# Patient Record
Sex: FEMALE | Race: WHITE | NOT HISPANIC OR LATINO | Employment: STUDENT | URBAN - METROPOLITAN AREA
[De-identification: names, ages, dates, MRNs, and addresses within clinical notes are randomized per-mention and may not be internally consistent; named-entity substitution may affect disease eponyms.]

---

## 2024-03-06 ENCOUNTER — OFFICE VISIT (OUTPATIENT)
Age: 19
End: 2024-03-06
Payer: COMMERCIAL

## 2024-03-06 VITALS
HEIGHT: 64 IN | DIASTOLIC BLOOD PRESSURE: 68 MMHG | RESPIRATION RATE: 16 BRPM | SYSTOLIC BLOOD PRESSURE: 118 MMHG | WEIGHT: 163.14 LBS | BODY MASS INDEX: 27.85 KG/M2 | HEART RATE: 79 BPM | OXYGEN SATURATION: 98 % | TEMPERATURE: 98.9 F

## 2024-03-06 DIAGNOSIS — R68.89 FLU-LIKE SYMPTOMS: Primary | ICD-10-CM

## 2024-03-06 DIAGNOSIS — H65.03 NON-RECURRENT ACUTE SEROUS OTITIS MEDIA OF BOTH EARS: ICD-10-CM

## 2024-03-06 PROCEDURE — 99203 OFFICE O/P NEW LOW 30 MIN: CPT | Performed by: EMERGENCY MEDICINE

## 2024-03-06 RX ORDER — PREDNISONE 10 MG/1
TABLET ORAL
Qty: 27 TABLET | Refills: 0 | Status: SHIPPED | OUTPATIENT
Start: 2024-03-06

## 2024-03-06 RX ORDER — NORETHINDRONE ACETATE AND ETHINYL ESTRADIOL 1MG-20(21)
KIT ORAL
COMMUNITY

## 2024-03-06 NOTE — PROGRESS NOTES
[facility]        NAME: Farideh Carr is a 18 y.o. female  : 2005    MRN: [id number]  DATE: 2024  TIME: 4:50 PM    Assessment and Plan   Flu-like symptoms [R68.89]  1. Flu-like symptoms  predniSONE 10 mg tablet            Patient Instructions     Patient Instructions   You have been diagnosed with a Flu like illness and your symptoms should resolve over the next 7 to 10 days with the treatments recommended today.  If they do not, it is possible that you have developed a bacterial infection and you should return. If you were to take an antibiotic while you are still in the viral stage, you will not get better any faster, but could kill off good germs in your body as well as make germs resistant to the antibiotic.  Take an expectorant - guaifenesin should be the only ingredient - during the day, and the cough suppressant (ex. Robitussin DM or Tessalon) if needed at night only.   Take Zinc 25 mg every 12 hours for the next week (the dose is important so do not just take a multivitamin with zinc or an over-the-counter cold med with zinc such as Airborne or Zicam, as that will not give you the sufficient dose).  You should also take   You should also take vitamin D3 5000 i.u.s per day for the next 1 week, and vitamin-C 1000 mg twice daily (and again dosages are important, do not think you are getting enough vitamin C just by drinking extra orange juice).  May take Flonase as discussed.  You may also take a decongestant like Sudafed, unless you have hypertension or cardiac disease.  You may take Imodium for diarrhea according to package instructions.     If you are diabetic you should adhere strictly to your diabetic diet and monitor blood sugar closely while on prednisone and you should discontinue the prednisone if blood sugar becomes significantly elevated.  Avoid nonsteroidal anti-inflammatories like Advil or Aleve while on prednisone.       Flu-like illness   AMBULATORY CARE:    Flu-like illness is an infection caused by a virus. The flu is easily spread when an infected person coughs, sneezes, or has close contact with others. You may be able to spread the flu to others for 1 week or longer after signs or symptoms appear.   Common signs and symptoms include the following:   Fever and chills    Headaches, body aches, and muscle or joint pain    Cough, runny nose, and sore throat    Loss of appetite, nausea, vomiting, or diarrhea    Tiredness    Trouble breathing  Call 911 for any of the following:   You have trouble breathing, and your lips look purple or blue.    You have a seizure.  Seek care immediately if:   You are dizzy, or you are urinating less or not at all.     You have a headache with a stiff neck, and you feel tired or confused.    You have new pain or pressure in your chest.    Your symptoms, such as shortness of breath, vomiting, or diarrhea, get worse.     Your symptoms, such as fever and coughing, seem to get better, but then get worse.  Contact your healthcare provider if:   You have new muscle pain or weakness.    You have questions or concerns about your condition or care.  Treatment for influenza  may include any of the following:  Acetaminophen decreases pain and fever. It is available without a doctor's order. Ask how much to take and how often to take it. Follow directions. Acetaminophen can cause liver damage if not taken correctly.    NSAIDs  such as ibuprofen, help decrease swelling, pain, and fever. This medicine is available with or without a doctor's order. NSAIDs can cause stomach bleeding or kidney problems in certain people. If you take blood thinner medicine, always ask your healthcare provider if NSAIDs are safe for you. Always read the medicine label and follow directions.    Antivirals  help fight a viral infection.  Manage your symptoms:   Rest  as much as you can to help you recover.    Drink liquids as directed  to help prevent dehydration. Ask how  much liquid to drink each day and which liquids are best for you.  Prevent the spread of the flu:   Wash your hands often.  Use soap and water. Wash your hands after you use the bathroom, change a child's diapers, or sneeze. Wash your hands before you prepare or eat food. Use gel hand cleanser when soap and water are not available. Do not touch your eyes, nose, or mouth unless you have washed your hands first.       Cover your mouth when you sneeze or cough.  Cough into a tissue or the bend of your arm.    Clean shared items with a germ-killing .  Clean table surfaces, doorknobs, and light switches. Do not share towels, silverware, and dishes with people who are sick. Wash bed sheets, towels, silverware, and dishes with soap and water.     Wear a mask  over your mouth and nose if you are sick or are near anyone who is sick.     Stay away from others  if you are sick.     Follow up with your healthcare provider as directed:  Write down your questions so you remember to ask them during your visits.   © 2017 [employer] Information is for End User's use only and may not be sold, redistributed or otherwise used for commercial purposes. All illustrations and images included in CareNotes® are the copyrighted property of [employer] or [employer].  The above information is an  only. It is not intended as medical advice for individual conditions or treatments. Talk to your doctor, nurse or pharmacist before following any medical regimen to see if it is safe and effective for you.       Fluid In The Ear (Serous Otitis Media)   WHAT YOU NEED TO KNOW:   REBECA is fluid trapped in the middle of your ear behind your eardrum. This condition usually develops without signs or symptoms of an ear infection. Serous otitis media may be caused by an upper respiratory infection or allergies. It is most common in the fall and early spring.       DISCHARGE INSTRUCTIONS:   Call your doctor  if:   You develop severe ear pain.    The outside of your ear is red or swollen.    You have fluid coming from your ear.    You have questions or concerns about your condition or care.    How to stay healthy:   Wash your hands often throughout the day.  Use soap and water. Rub your soapy hands together, lacing your fingers, for at least 20 seconds. Rinse with warm, running water. Dry your hands with a clean towel or paper towel. Use hand  that contains alcohol if soap and water are not available. Teach children how to wash their hands and use hand .         Avoid people who are sick.  Some germs are easily and quickly spread through contact.    Follow up with your doctor as directed:  Write down your questions so you remember to ask them during your visits.   © Copyright [employer] 2023 Information is for End User's use only and may not be sold, redistributed or otherwise used for commercial purposes.  The above information is an  only. It is not intended as medical advice for individual conditions or treatments. Talk to your doctor, nurse or pharmacist before following any medical regimen to see if it is safe and effective for you.        Follow up with PCP in 3-5 days.  Proceed to  ER if symptoms worsen.    Chief Complaint     Chief Complaint   Patient presents with    Cold Like Symptoms     Stuffy nose, earache, sore throat, headache and painful respirations, fever/chills. Started Monday 03/04/2024. Tylenol cold without relief.          History of Present Illness       Patient complains of sore throat, congestion, ear fullness, headaches, fevers, chills for the past 2 days.                Review of Systems   Review of Systems   Constitutional:  Positive for chills and fever. Negative for appetite change and fatigue.   HENT:  Positive for congestion, ear pain, rhinorrhea, sinus pressure and sore throat. Negative for trouble swallowing and voice change.    Respiratory:  Positive for  "cough. Negative for chest tightness, shortness of breath and wheezing.    Cardiovascular:  Negative for chest pain.   Gastrointestinal:  Negative for nausea.   Musculoskeletal:  Negative for myalgias.   Neurological:  Positive for headaches.         Current Medications       Current Outpatient Medications:     predniSONE 10 mg tablet, Take once daily all days pills on this schedule 6- 6- 5- 4- 3- 2- 1, Disp: 27 tablet, Rfl: 0    norethindrone-ethinyl estradiol (JUNEL FE 1/20) 1-20 MG-MCG per tablet, Take by mouth, Disp: , Rfl:     Current Allergies     Allergies as of 03/06/2024    (No Known Allergies)            The following portions of the patient's history were reviewed and updated as appropriate: allergies, current medications, past family history, past medical history, past social history, past surgical history and problem list.     History reviewed. No pertinent past medical history.    Past Surgical History:   Procedure Laterality Date    ADENOIDECTOMY      APPENDECTOMY      EYE SURGERY      TONSILLECTOMY         Family History   Problem Relation Age of Onset    Hypertension Mother     Long QT syndrome Mother     Hypertension Father     VIRGINIE disease Father          Medications have been verified.        Objective   /68   Pulse 79   Temp 98.9 °F (37.2 °C) (Tympanic)   Resp 16   Ht 5' 4\" (1.626 m)   Wt 74 kg (163 lb 2.3 oz)   LMP 02/26/2024 (Exact Date)   SpO2 98%   BMI 28.00 kg/m²        Physical Exam     Physical Exam  Vitals and nursing note reviewed.   Constitutional:       General: She is not in acute distress.     Appearance: She is well-developed. She is not ill-appearing or toxic-appearing.   HENT:      Head: Normocephalic and atraumatic.      Right Ear: External ear normal.      Left Ear: External ear normal.      Ears:      Comments: Clear effusions behind both TMs, no erythema of TMs.     Nose: Mucosal edema and congestion present.      Mouth/Throat:      Pharynx: Posterior " oropharyngeal erythema present. No oropharyngeal exudate.      Tonsils: No tonsillar abscesses.   Cardiovascular:      Rate and Rhythm: Normal rate and regular rhythm.   Pulmonary:      Effort: Pulmonary effort is normal. No respiratory distress.      Breath sounds: No wheezing or rales.   Musculoskeletal:      Cervical back: Neck supple.   Skin:     General: Skin is warm and dry.   Neurological:      Mental Status: She is alert and oriented to person, place, and time.   Psychiatric:         Mood and Affect: Mood normal.         Behavior: Behavior normal.         Thought Content: Thought content normal.         Judgment: Judgment normal.

## 2024-03-06 NOTE — PATIENT INSTRUCTIONS
You have been diagnosed with a Flu like illness and your symptoms should resolve over the next 7 to 10 days with the treatments recommended today.  If they do not, it is possible that you have developed a bacterial infection and you should return. If you were to take an antibiotic while you are still in the viral stage, you will not get better any faster, but could kill off good germs in your body as well as make germs resistant to the antibiotic.  Take an expectorant - guaifenesin should be the only ingredient - during the day, and the cough suppressant (ex. Robitussin DM or Tessalon) if needed at night only.   Take Zinc 25 mg every 12 hours for the next week (the dose is important so do not just take a multivitamin with zinc or an over-the-counter cold med with zinc such as Airborne or Zicam, as that will not give you the sufficient dose).  You should also take   You should also take vitamin D3 5000 i.u.s per day for the next 1 week, and vitamin-C 1000 mg twice daily (and again dosages are important, do not think you are getting enough vitamin C just by drinking extra orange juice).  May take Flonase as discussed.  You may also take a decongestant like Sudafed, unless you have hypertension or cardiac disease.  You may take Imodium for diarrhea according to package instructions.     If you are diabetic you should adhere strictly to your diabetic diet and monitor blood sugar closely while on prednisone and you should discontinue the prednisone if blood sugar becomes significantly elevated.  Avoid nonsteroidal anti-inflammatories like Advil or Aleve while on prednisone.       Flu-like illness   AMBULATORY CARE:   Flu-like illness is an infection caused by a virus. The flu is easily spread when an infected person coughs, sneezes, or has close contact with others. You may be able to spread the flu to others for 1 week or longer after signs or symptoms appear.   Common signs and symptoms include the following:   Fever  and chills    Headaches, body aches, and muscle or joint pain    Cough, runny nose, and sore throat    Loss of appetite, nausea, vomiting, or diarrhea    Tiredness    Trouble breathing  Call 911 for any of the following:   You have trouble breathing, and your lips look purple or blue.    You have a seizure.  Seek care immediately if:   You are dizzy, or you are urinating less or not at all.     You have a headache with a stiff neck, and you feel tired or confused.    You have new pain or pressure in your chest.    Your symptoms, such as shortness of breath, vomiting, or diarrhea, get worse.     Your symptoms, such as fever and coughing, seem to get better, but then get worse.  Contact your healthcare provider if:   You have new muscle pain or weakness.    You have questions or concerns about your condition or care.  Treatment for influenza  may include any of the following:  Acetaminophen decreases pain and fever. It is available without a doctor's order. Ask how much to take and how often to take it. Follow directions. Acetaminophen can cause liver damage if not taken correctly.    NSAIDs  such as ibuprofen, help decrease swelling, pain, and fever. This medicine is available with or without a doctor's order. NSAIDs can cause stomach bleeding or kidney problems in certain people. If you take blood thinner medicine, always ask your healthcare provider if NSAIDs are safe for you. Always read the medicine label and follow directions.    Antivirals  help fight a viral infection.  Manage your symptoms:   Rest  as much as you can to help you recover.    Drink liquids as directed  to help prevent dehydration. Ask how much liquid to drink each day and which liquids are best for you.  Prevent the spread of the flu:   Wash your hands often.  Use soap and water. Wash your hands after you use the bathroom, change a child's diapers, or sneeze. Wash your hands before you prepare or eat food. Use gel hand cleanser when soap and  water are not available. Do not touch your eyes, nose, or mouth unless you have washed your hands first.       Cover your mouth when you sneeze or cough.  Cough into a tissue or the bend of your arm.    Clean shared items with a germ-killing .  Clean table surfaces, doorknobs, and light switches. Do not share towels, silverware, and dishes with people who are sick. Wash bed sheets, towels, silverware, and dishes with soap and water.     Wear a mask  over your mouth and nose if you are sick or are near anyone who is sick.     Stay away from others  if you are sick.     Follow up with your healthcare provider as directed:  Write down your questions so you remember to ask them during your visits.   © 2017 [employer] Information is for End User's use only and may not be sold, redistributed or otherwise used for commercial purposes. All illustrations and images included in CareNotes® are the copyrighted property of [employer]. or [employer].  The above information is an  only. It is not intended as medical advice for individual conditions or treatments. Talk to your doctor, nurse or pharmacist before following any medical regimen to see if it is safe and effective for you.       Fluid In The Ear (Serous Otitis Media)   WHAT YOU NEED TO KNOW:   REBECA is fluid trapped in the middle of your ear behind your eardrum. This condition usually develops without signs or symptoms of an ear infection. Serous otitis media may be caused by an upper respiratory infection or allergies. It is most common in the fall and early spring.       DISCHARGE INSTRUCTIONS:   Call your doctor if:   You develop severe ear pain.    The outside of your ear is red or swollen.    You have fluid coming from your ear.    You have questions or concerns about your condition or care.    How to stay healthy:   Wash your hands often throughout the day.  Use soap and water. Rub your soapy hands together,  lacing your fingers, for at least 20 seconds. Rinse with warm, running water. Dry your hands with a clean towel or paper towel. Use hand  that contains alcohol if soap and water are not available. Teach children how to wash their hands and use hand .         Avoid people who are sick.  Some germs are easily and quickly spread through contact.    Follow up with your doctor as directed:  Write down your questions so you remember to ask them during your visits.   © Copyright [employer] 2023 Information is for End User's use only and may not be sold, redistributed or otherwise used for commercial purposes.  The above information is an  only. It is not intended as medical advice for individual conditions or treatments. Talk to your doctor, nurse or pharmacist before following any medical regimen to see if it is safe and effective for you.

## 2025-02-24 ENCOUNTER — OFFICE VISIT (OUTPATIENT)
Age: 20
End: 2025-02-24
Payer: COMMERCIAL

## 2025-02-24 VITALS
RESPIRATION RATE: 18 BRPM | WEIGHT: 159.61 LBS | OXYGEN SATURATION: 99 % | BODY MASS INDEX: 27.25 KG/M2 | TEMPERATURE: 97.8 F | SYSTOLIC BLOOD PRESSURE: 122 MMHG | HEART RATE: 64 BPM | DIASTOLIC BLOOD PRESSURE: 60 MMHG | HEIGHT: 64 IN

## 2025-02-24 DIAGNOSIS — J06.9 ACUTE URI: Primary | ICD-10-CM

## 2025-02-24 PROCEDURE — S9083 URGENT CARE CENTER GLOBAL: HCPCS

## 2025-02-24 PROCEDURE — G0382 LEV 3 HOSP TYPE B ED VISIT: HCPCS

## 2025-02-24 RX ORDER — BENZONATATE 200 MG/1
200 CAPSULE ORAL 3 TIMES DAILY PRN
Qty: 20 CAPSULE | Refills: 0 | Status: SHIPPED | OUTPATIENT
Start: 2025-02-24

## 2025-02-24 RX ORDER — METHYLPREDNISOLONE 4 MG/1
TABLET ORAL
Qty: 21 TABLET | Refills: 0 | Status: SHIPPED | OUTPATIENT
Start: 2025-02-24

## 2025-02-24 NOTE — PATIENT INSTRUCTIONS
"Patient Education     Upper respiratory infection in adults - Discharge instructions   The Basics   Written by the doctors and editors at [facility]   What are discharge instructions? -- Discharge instructions are information about how to take care of yourself after getting medical care for a health problem.  What is an upper respiratory infection? -- An upper respiratory infection (\"URI\") is an illness that can affect your nose, throat, ears, and sinuses. Almost all URIs are caused by a virus. The common cold is an example of a viral URI. Some URIs are caused by bacteria, but this is much less common.  URIs spread easily from person to person, most often through coughing or sneezing. A URI will almost always get better in a week or 2 without any treatment. Because most URIs are caused by viruses, antibiotics do not usually help.  If you do have a bacterial infection, your doctor might prescribe antibiotics.  How do I care for myself at home? -- Ask the doctor or nurse what you should do when you go home. Make sure that you understand exactly what you need to do to care for yourself. Ask questions if there is anything you do not understand.  You should also:   Wash your hands often (figure 1), and cough or sneeze into a tissue. If you do not have a tissue, cough or sneeze into your elbow instead of your hands.   Drink lots of fluids (water, juice, or broth) to stay hydrated, unless your doctor told you otherwise. This will help replace any fluids lost through runny nose or fever. Warm tea or soup can also help soothe a sore throat.   To help a stuffy nose and make it easier to breathe:   Use saline nose drops or spray.   Use a humidifier if the air in your home feels dry.   Follow the directions on the label carefully if you take over-the-counter cough or cold medicines. Do not take more than 1 medicine that contains acetaminophen. Also, if you have a heart problem or high blood pressure, check with your doctor before " you take any of these medicines.   Try to quit smoking if you smoke. Your doctor or nurse can help.  How can I prevent getting another URI? -- The best way to prevent a URI, or keep it from spreading to others, is to keep your hands clean. Wash your hands often with soap and water or alcohol gel rubs.  Some other ways to prevent the spread of infection include:   Always wash your hands with soap and water after you cough, sneeze, or blow your nose.   Clean surfaces and objects that you touch a lot. These include sinks, counters, tables, door handles, remotes, and phones. Use a bleach and water mixture. The germs that cause a URI can live on surfaces for at least 2 hours.   Do not share cups, food, towels, bed linens, or other personal items.   Stay away from other people when you are sick. When you do need to be around other people, consider wearing a face mask.  When should I call the doctor? -- Call for advice if:   You have a persistent fever of 100.4°F (38°C) or higher, chills, a very bad sore throat, or ear or sinus pain.   You get a new fever after several days of feeling the same or getting better.   You start having chest pain when you cough.   You have a cough that lasts more than 10 days.   You cough up blood.   You have any new or worsening symptoms, such as worsening cough or trouble breathing.  All topics are updated as new evidence becomes available and our peer review process is complete.  This topic retrieved from [employer] on: Mar 13, 2024.  Topic 943008 Version 1.0  Release: 32.2.4 - C32.71  © 2024 UpToDate, Inc. and/or its affiliates. All rights reserved.  figure 1: How to wash your hands     Wet your hands with clean water, and apply a small amount of soap. Lather and rub hands together for at least 20 seconds. Clean your wrists, palms, backs of your hands, between your fingers, tips of your fingers, thumbs, and under and around your nails. Rinse well, and dry your hands using a clean  ayaz.  Graphic 470893 Version 7.0  Consumer Information Use and Disclaimer   Disclaimer: This generalized information is a limited summary of diagnosis, treatment, and/or medication information. It is not meant to be comprehensive and should be used as a tool to help the user understand and/or assess potential diagnostic and treatment options. It does NOT include all information about conditions, treatments, medications, side effects, or risks that may apply to a specific patient. It is not intended to be medical advice or a substitute for the medical advice, diagnosis, or treatment of a health care provider based on the health care provider's examination and assessment of a patient's specific and unique circumstances. Patients must speak with a health care provider for complete information about their health, medical questions, and treatment options, including any risks or benefits regarding use of medications. This information does not endorse any treatments or medications as safe, effective, or approved for treating a specific patient. UpToDate, Inc. and its affiliates disclaim any warranty or liability relating to this information or the use thereof.The use of this information is governed by the Terms of Use, available at https://www.[employer].com/en/know/clinical-effectiveness-terms. 2024© UpToDate, Inc. and its affiliates and/or licensors. All rights reserved.  Copyright   © 2024 UpToDate, Inc. and/or its affiliates. All rights reserved.

## 2025-03-31 ENCOUNTER — OFFICE VISIT (OUTPATIENT)
Age: 20
End: 2025-03-31
Payer: COMMERCIAL

## 2025-03-31 VITALS
OXYGEN SATURATION: 99 % | HEIGHT: 64 IN | BODY MASS INDEX: 27.1 KG/M2 | HEART RATE: 78 BPM | RESPIRATION RATE: 18 BRPM | WEIGHT: 158.73 LBS | DIASTOLIC BLOOD PRESSURE: 76 MMHG | TEMPERATURE: 98.8 F | SYSTOLIC BLOOD PRESSURE: 114 MMHG

## 2025-03-31 DIAGNOSIS — G44.319 ACUTE POST-TRAUMATIC HEADACHE, NOT INTRACTABLE: Primary | ICD-10-CM

## 2025-03-31 DIAGNOSIS — L60.8 NAIL DISCOLORATION: ICD-10-CM

## 2025-03-31 PROCEDURE — S9083 URGENT CARE CENTER GLOBAL: HCPCS

## 2025-03-31 PROCEDURE — G0383 LEV 4 HOSP TYPE B ED VISIT: HCPCS

## 2025-03-31 NOTE — LETTER
March 31, 2025     Patient: Farideh Carr   YOB: 2005   Date of Visit: 3/31/2025       To Whom it May Concern:    Farideh Carr was seen in my clinic on 3/31/2025. She may return to school on 4/3/2025 or earlier if symptoms are improving .    If you have any questions or concerns, please don't hesitate to call.         Sincerely,          Kerrie Spear PA-C        CC: No Recipients

## 2025-03-31 NOTE — PATIENT INSTRUCTIONS
"Head Injury:  Over-the-counter pain medication as directed on packaging as needed for pain-may alternate Tylenol and ibuprofen every 3 hours.  Ice to area.  Decrease screen time.  Rest.  Ensure adequate hydration and nutrition.    Toenail problem:  Continue with over-the-counter fungal medication to area.  Referral to podiatry placed for follow-up if no improvement in symptoms.    Follow up with PCP in 3-5 days.  Proceed to  ER if symptoms worsen.    If tests are performed, our office will contact you with results only if changes need to made to the care plan discussed with you at the visit. You can review your full results on [facility].    Patient Education     Head injury in adults   The Basics   Written by the doctors and editors at [facility]   What causes head injuries? -- A head injury can happen when a person hits their head on a hard surface or is hit in the head with something. The most common causes of head injury are falls, sports injuries, and car and bike accidents.  Some head injuries are minor, like a bump on the head. With minor head injuries, the skull protects the brain from damage. Others can be more serious and cause brain injury. A \"concussion\" is the medical term for a mild brain injury. Sometimes, head injuries can be serious or life-threatening.  A more serious head injury can cause:   Broken bone of the skull or face (figure 1)   Brain injury or swelling   Bleeding in or around the brain  This article discusses head injuries in people 18 years and older. Head injuries in children might be managed differently.  What are the symptoms of a head injury? -- Symptoms depend on the type of injury a person has and how severe it is. People with a minor head injury, such as a bump on the head, might not have any symptoms.  Some people pass out or lose consciousness when they get a head injury. If a person does not wake up quickly, or blacks out several minutes or hours after a head injury, they " might have bleeding in the brain. The person needs emergency help.  Other symptoms that can happen after a head injury include:   Headache   Nausea or vomiting   Swelling, bleeding, or bruising on the scalp   Dizziness   Confusion or memory problems   Vision problems   Feeling tired or sleepy   Mood or behavior changes, or not acting like yourself   Trouble walking or talking   Seizures - Seizures are waves of abnormal electrical activity in the brain. They can make you pass out, or move or behave strangely.  A head injury that involves a broken skull or face bone can also cause:   Bruising around the eyes or behind the ear   Blood or clear fluid draining from the nose or ear  Symptoms can start right after a head injury, or a few hours or days later.  Some people have symptoms that last a short time only. Other people have symptoms that cause long-lasting problems.  Will I need tests? -- It depends on your injury and symptoms. Your doctor or nurse will ask about your symptoms and do an exam. They will also ask questions to find out if you are able to think clearly.  If your doctor or nurse thinks that you might have a serious injury, they might order an imaging test of your brain. This might be a CT or MRI scan. These tests create pictures of your skull and brain.  How are head injuries treated? -- Treatment depends on your injury and how serious it is.  Usually, minor head injuries do not need treatment. But your doctor might recommend things like:   Having someone stay with you for 24 hours after your injury - This person should watch for new symptoms or the symptoms listed above. They should also make sure that you can wake up at a normal time after you fall asleep. It is not usually necessary to wake you up during the night.   Taking over-the-counter pain medicines - Acetaminophen (sample brand name: Tylenol) might help relieve a headache.   Rest - It can be important to rest if you have symptoms after a  concussion. This means resting your body and avoiding activities that make you feel worse. It can also help to rest your brain. Avoid looking at screens until you are feeling better.   Ice - If you bumped your head, ice can help with pain and swelling. Apply a cold gel pack, bag of ice, or bag of frozen vegetables on the area every 1 to 2 hours, for 15 minutes each time. Put a thin towel between the ice (or other cold object) and your head. Use the ice (or other cold object) for at least 6 hours after your injury.  Severe head injuries need to be treated in the hospital. In this case, treatment can include:   Medicines - Different medicines can help prevent brain swelling, bleeding in the brain, or seizures.   Surgery - If you have bleeding in or around your brain, or if your brain swells, you might need surgery.  When should I call for help? -- If you had a head injury, there are certain problems that you or your caregiver should watch for.  Someone should call for an ambulance (in the [place] and [place], call 9-1-1) if you:   Cannot be fully woken up   Are acting confused or disoriented   Have a sudden and persistent change in your behavior   Cannot walk normally   Have trouble speaking or slurred speech   Have severe weakness or cannot move an arm, leg, or 1 side of your face   Have a seizure, or jerking of your arms or legs you cannot control  Call the doctor or nurse for advice if you:   Have trouble concentrating, thinking clearly, or remembering things   Have trouble waking from sleep or staying awake   Have nausea or vomiting that is not improving   Have blurry eyesight, double vision, or other problems seeing   Have blood or clear liquid draining from your ears or nose   Feel dizzy or faint   Seem weak or have numbness in an arm, leg, or other body part   Have a stiff neck   Have a headache that is severe, gets worse, feels different, or does not get better with over-the-counter medicines  If any of the above  symptoms seem severe, or if you are concerned but cannot reach the doctor or nurse, seek emergency help. These things don't always mean there is a serious problem, but seeing a doctor or nurse is the only way to know for sure.  When can I play sports or do my usual activities again? -- Ask your doctor when you can play sports or do your usual activities again. It depends on your injury and symptoms.  How can head injuries be prevented? -- To help prevent another head injury, you should wear a helmet when you ride a bike or motorcycle, or when you play sports where you could hit your head. You should also wear a seat belt every time you drive or ride in a car.  All topics are updated as new evidence becomes available and our peer review process is complete.  This topic retrieved from [employer] on: Mar 06, 2024.  Topic 62771 Version 13.0  Release: 32.2.4 - C32.64  © 2024 UpToDate, Inc. and/or its affiliates. All rights reserved.  figure 1: Bones of the skull and face     Graphic 18410 Version 2.0  Consumer Information Use and Disclaimer   Disclaimer: This generalized information is a limited summary of diagnosis, treatment, and/or medication information. It is not meant to be comprehensive and should be used as a tool to help the user understand and/or assess potential diagnostic and treatment options. It does NOT include all information about conditions, treatments, medications, side effects, or risks that may apply to a specific patient. It is not intended to be medical advice or a substitute for the medical advice, diagnosis, or treatment of a health care provider based on the health care provider's examination and assessment of a patient's specific and unique circumstances. Patients must speak with a health care provider for complete information about their health, medical questions, and treatment options, including any risks or benefits regarding use of medications. This information does not endorse any treatments  or medications as safe, effective, or approved for treating a specific patient. UpToDate, Inc. and its affiliates disclaim any warranty or liability relating to this information or the use thereof.The use of this information is governed by the Terms of Use, available at https://www.[employer].com/en/know/clinical-effectiveness-terms. 2024© UpToDate, Inc. and its affiliates and/or licensors. All rights reserved.  Copyright   © 2024 UpToDate, Inc. and/or its affiliates. All rights reserved.

## 2025-03-31 NOTE — PROGRESS NOTES
"  [facility]        NAME: Farideh Carr is a 19 y.o. female  : 2005    MRN: [id number]  DATE: 2025  TIME: 6:51 PM    Assessment and Plan   Acute post-traumatic headache, not intractable [G44.319]  1. Acute post-traumatic headache, not intractable        2. Nail discoloration  Ambulatory Referral to Podiatry        Presentation consistent with post-traumatic headache.  No signs or symptoms of concussion today.  Advised symptomatic treatment.  School note provided.    Presentation consistent with trauma to nail/discoloration from gel nail polish use versus fungal toenail infection.  Area is not bothersome to patient.  Advised to continue with over-the-counter treatment.  Podiatry referral placed.    25 minutes spent with patient discussing two separate issues.    Patient Instructions     Head Injury:  Over-the-counter pain medication as directed on packaging as needed for pain-may alternate Tylenol and ibuprofen every 3 hours.  Ice to area.  Decrease screen time.  Rest.  Ensure adequate hydration and nutrition.    Toenail problem:  Continue with over-the-counter fungal medication to area.  Referral to podiatry placed for follow-up if no improvement in symptoms.    Follow up with PCP in 3-5 days.  Proceed to  ER if symptoms worsen.    If tests are performed, our office will contact you with results only if changes need to made to the care plan discussed with you at the visit. You can review your full results on [facility].    Chief Complaint     Chief Complaint   Patient presents with    Head Injury     Hit her right forehead on wooden floor on Saturday around 3pm. Headache started around 7pm that night and constant since then. Denies other concussive symptoms.          History of Present Illness       Patient presents for evaluation of a head injury and toenail problem.  States 2 days ago while drinking, she and her friends were performing an [employer] \"tush push,\" when she fell " to the ground and hit her head on the wooden floor.  Denies LOC, blood thinners, daily aspirin.  A few hours later she started with a headache.  She has had a headache since then.  Notes headache has been constant, but the amount of pain is becoming less.  She would rate her pain currently as a 3/10.  Patient notes she was drinking all day the day of the injury, did have a few more drinks after the injury and had some more drinks yesterday.  Notes a bump that is tender to touch to the top right of her head, near the edge of her hairline.  Also admits to soreness and stiffness in left side of neck.  Denies nausea, vomiting, dizziness, lightheadedness, changes in vision, loss of balance, loss of memory, numbness, tingling, weakness.  Notes history of a concussion in the past, about 10 years ago.  Denies history of chronic headaches or migraines.  She has tried ice to the area, has not taken any pain medicines so far.  Also notes bilateral great toenail discoloration.  States recently she picked her gel nail polish off.  She had to cut her right great toenail down significantly, notes it is yellow in color.  The left great toenail is still fully intact, but is also slightly yellow in color.  Denies pain to areas.  Has been using over-the-counter nail fungal treatment without significant relief.        Review of Systems   Review of Systems   Eyes:  Negative for photophobia and visual disturbance.   Gastrointestinal:  Negative for nausea and vomiting.   Musculoskeletal:  Positive for neck pain (left side) and neck stiffness. Negative for back pain.   Skin:  Negative for wound.   Neurological:  Positive for headaches. Negative for dizziness, syncope, speech difficulty, weakness and light-headedness.   Psychiatric/Behavioral:  Negative for confusion.          Current Medications       Current Outpatient Medications:     benzonatate (TESSALON) 200 MG capsule, Take 1 capsule (200 mg total) by mouth 3 (three) times a day as  "needed for cough (Patient not taking: Reported on 3/31/2025), Disp: 20 capsule, Rfl: 0    methylPREDNISolone 4 MG tablet therapy pack, Use as directed on package (Patient not taking: Reported on 3/31/2025), Disp: 21 tablet, Rfl: 0    norethindrone-ethinyl estradiol (JUNEL FE 1/20) 1-20 MG-MCG per tablet, Take by mouth, Disp: , Rfl:     sertraline (ZOLOFT) 50 mg tablet, Take 1 tablet by mouth in the morning, Disp: , Rfl:     Current Allergies     Allergies as of 03/31/2025    (No Known Allergies)            The following portions of the patient's history were reviewed and updated as appropriate: allergies, current medications, past family history, past medical history, past social history, past surgical history and problem list.     Past Medical History:   Diagnosis Date    Anxiety        Past Surgical History:   Procedure Laterality Date    ADENOIDECTOMY      APPENDECTOMY      EYE SURGERY      TONSILLECTOMY         Family History   Problem Relation Age of Onset    Hypertension Mother     Long QT syndrome Mother     Hypertension Father     VIRGINIE disease Father          Medications have been verified.        Objective   /76 (Patient Position: Sitting)   Pulse 78   Temp 98.8 °F (37.1 °C) (Tympanic)   Resp 18   Ht 5' 4\" (1.626 m)   Wt 72 kg (158 lb 11.7 oz)   LMP 03/17/2025 (Exact Date)   SpO2 99%   BMI 27.25 kg/m²        Physical Exam     Physical Exam  Vitals and nursing note reviewed.   Constitutional:       General: She is not in acute distress.     Appearance: Normal appearance. She is not ill-appearing.   HENT:      Right Ear: Tympanic membrane, ear canal and external ear normal.      Left Ear: Tympanic membrane, ear canal and external ear normal.      Nose: Nose normal.      Mouth/Throat:      Mouth: Mucous membranes are moist.      Pharynx: Oropharynx is clear.   Eyes:      Extraocular Movements: Extraocular movements intact.      Pupils: Pupils are equal, round, and reactive to light. "   Cardiovascular:      Rate and Rhythm: Normal rate and regular rhythm.      Pulses: Normal pulses.      Heart sounds: Normal heart sounds. No murmur heard.  Pulmonary:      Effort: Pulmonary effort is normal. No respiratory distress.      Breath sounds: Normal breath sounds. No stridor. No wheezing, rhonchi or rales.   Musculoskeletal:      Cervical back: Full passive range of motion without pain and neck supple. No rigidity or tenderness. No spinous process tenderness or muscular tenderness.   Feet:      Comments: Right great toenail with yellow discoloration, abnormal growth.  Left great toenail with slight yellow discoloration, normal growth.  Neurological:      General: No focal deficit present.      Mental Status: She is alert and oriented to person, place, and time.      GCS: GCS eye subscore is 4. GCS verbal subscore is 5. GCS motor subscore is 6.      Cranial Nerves: Cranial nerves 2-12 are intact.      Sensory: Sensation is intact.      Motor: Motor function is intact.      Coordination: Coordination is intact. Romberg sign negative.      Gait: Gait is intact.